# Patient Record
Sex: FEMALE | Race: BLACK OR AFRICAN AMERICAN | NOT HISPANIC OR LATINO | URBAN - METROPOLITAN AREA
[De-identification: names, ages, dates, MRNs, and addresses within clinical notes are randomized per-mention and may not be internally consistent; named-entity substitution may affect disease eponyms.]

---

## 2023-07-07 ENCOUNTER — OFFICE VISIT (OUTPATIENT)
Dept: URBAN - METROPOLITAN AREA CLINIC 118 | Facility: CLINIC | Age: 58
End: 2023-07-07

## 2023-09-06 ENCOUNTER — OFFICE VISIT (OUTPATIENT)
Dept: URBAN - METROPOLITAN AREA CLINIC 118 | Facility: CLINIC | Age: 58
End: 2023-09-06
Payer: MEDICARE

## 2023-09-06 ENCOUNTER — LAB OUTSIDE AN ENCOUNTER (OUTPATIENT)
Dept: URBAN - METROPOLITAN AREA CLINIC 118 | Facility: CLINIC | Age: 58
End: 2023-09-06

## 2023-09-06 VITALS
BODY MASS INDEX: 23.78 KG/M2 | DIASTOLIC BLOOD PRESSURE: 88 MMHG | HEART RATE: 84 BPM | SYSTOLIC BLOOD PRESSURE: 135 MMHG | WEIGHT: 148 LBS | HEIGHT: 66 IN | TEMPERATURE: 98.1 F

## 2023-09-06 DIAGNOSIS — R63.4 UNINTENTIONAL WEIGHT LOSS: ICD-10-CM

## 2023-09-06 DIAGNOSIS — R93.3 ABNORMAL CT SCAN, STOMACH: ICD-10-CM

## 2023-09-06 DIAGNOSIS — Z86.010 PERSONAL HISTORY OF COLONIC POLYPS: ICD-10-CM

## 2023-09-06 DIAGNOSIS — K83.8 DILATION OF COMMON BILE DUCT: ICD-10-CM

## 2023-09-06 PROBLEM — 428283002: Status: ACTIVE | Noted: 2023-09-06

## 2023-09-06 PROBLEM — 123608004: Status: ACTIVE | Noted: 2023-09-06

## 2023-09-06 PROCEDURE — 99204 OFFICE O/P NEW MOD 45 MIN: CPT | Performed by: INTERNAL MEDICINE

## 2023-09-06 NOTE — HPI-TODAY'S VISIT:
Ms. Shields is a 59 y/o AAF who presents today for evaluation of a lump in her stomach.  She started to notice the lump ~2 years ago. It has progressively increased in size over the past 2 years. Painful / achy at times. She reports intermittent reflux, heartburn and N/V for which she takes prn pepto.  She has lost ~50# in the past 6 months. She denies dysphagia, GI bleeding or fever.  She underwent CT A/P 5/4 which showed a focal masslike wall thickening of the gastric fundus. Also with dilatation of the common bile to 1.3 cm and mild intrahepatic biliary dilatation, nonspecific and could be within normal limits given post cholecystectomy state.  Patient reports prior EGD 5+ years ago, which she states was normal. Of note, her mother and maternal grandmother both with hx of gastric cancer. Last colonoscopy ~5 years ago with ?polyps noted.

## 2023-09-06 NOTE — PHYSICAL EXAM GASTROINTESTINAL
Abdomen , soft, golf ball sized, fixed, hard mass palpated in the epigastric region, nondistended , no guarding or rigidity , no masses palpable , normal bowel sounds , Liver and Spleen , no hepatosplenomegaly present, liver nontender Rectal  deferred

## 2023-09-15 LAB
ALBUMIN/GLOBULIN RATIO: 1.5
ALBUMIN: 4.8
ALKALINE PHOSPHATASE: 203
ALT (SGPT): 176
AST (SGOT): 526
BILIRUBIN, DIRECT: 0.3
BILIRUBIN, INDIRECT: 0.4
BILIRUBIN, TOTAL: 0.7
GLOBULIN: 3.2
PROTEIN, TOTAL: 8

## 2023-09-18 ENCOUNTER — TELEPHONE ENCOUNTER (OUTPATIENT)
Dept: URBAN - METROPOLITAN AREA CLINIC 118 | Facility: CLINIC | Age: 58
End: 2023-09-18

## 2023-09-18 ENCOUNTER — LAB OUTSIDE AN ENCOUNTER (OUTPATIENT)
Dept: URBAN - METROPOLITAN AREA CLINIC 118 | Facility: CLINIC | Age: 58
End: 2023-09-18

## 2023-09-24 ENCOUNTER — CLAIMS CREATED FROM THE CLAIM WINDOW (OUTPATIENT)
Dept: URBAN - METROPOLITAN AREA MEDICAL CENTER 16 | Facility: MEDICAL CENTER | Age: 58
End: 2023-09-24
Payer: MEDICARE

## 2023-09-24 DIAGNOSIS — R74.01 ABNORMAL/ELEVATED TRANSAMINASE (SGOT, AMINOTRANSFERASE): ICD-10-CM

## 2023-09-24 DIAGNOSIS — R10.11 RIGHT UPPER QUADRANT PAIN: ICD-10-CM

## 2023-09-24 DIAGNOSIS — R74.8 ABNORMAL ALKALINE PHOSPHATASE TEST: ICD-10-CM

## 2023-09-24 DIAGNOSIS — R93.2 ABN FIND-BILIARY TRACT: ICD-10-CM

## 2023-09-24 PROCEDURE — G8427 DOCREV CUR MEDS BY ELIG CLIN: HCPCS | Performed by: INTERNAL MEDICINE

## 2023-09-24 PROCEDURE — 99222 1ST HOSP IP/OBS MODERATE 55: CPT | Performed by: INTERNAL MEDICINE

## 2023-09-25 ENCOUNTER — CLAIMS CREATED FROM THE CLAIM WINDOW (OUTPATIENT)
Dept: URBAN - METROPOLITAN AREA MEDICAL CENTER 16 | Facility: MEDICAL CENTER | Age: 58
End: 2023-09-25
Payer: MEDICARE

## 2023-09-25 DIAGNOSIS — R74.01 ELEVATION OF LEVELS OF LIVER TRANSAMINASE LEVELS: ICD-10-CM

## 2023-09-25 DIAGNOSIS — R10.11 RIGHT UPPER QUADRANT PAIN: ICD-10-CM

## 2023-09-25 DIAGNOSIS — R94.5 ABNORMAL RESULTS OF LIVER FUNCTION STUDIES: ICD-10-CM

## 2023-09-25 DIAGNOSIS — R74.8 ABNORMAL ALKALINE PHOSPHATASE TEST: ICD-10-CM

## 2023-09-25 DIAGNOSIS — R93.2 ABN FIND-BILIARY TRACT: ICD-10-CM

## 2023-09-25 PROCEDURE — 99232 SBSQ HOSP IP/OBS MODERATE 35: CPT | Performed by: INTERNAL MEDICINE

## 2023-09-26 ENCOUNTER — CLAIMS CREATED FROM THE CLAIM WINDOW (OUTPATIENT)
Dept: URBAN - METROPOLITAN AREA MEDICAL CENTER 16 | Facility: MEDICAL CENTER | Age: 58
End: 2023-09-26
Payer: MEDICARE

## 2023-09-26 ENCOUNTER — OFFICE VISIT (OUTPATIENT)
Dept: URBAN - METROPOLITAN AREA SURGERY CENTER 23 | Facility: SURGERY CENTER | Age: 58
End: 2023-09-26

## 2023-09-26 DIAGNOSIS — R93.2 ABN FIND-BILIARY TRACT: ICD-10-CM

## 2023-09-26 DIAGNOSIS — R74.01 ELEVATION OF LEVELS OF LIVER TRANSAMINASE LEVELS: ICD-10-CM

## 2023-09-26 DIAGNOSIS — R10.11 RIGHT UPPER QUADRANT PAIN: ICD-10-CM

## 2023-09-26 DIAGNOSIS — R94.5 ABNORMAL RESULTS OF LIVER FUNCTION STUDIES: ICD-10-CM

## 2023-09-26 PROCEDURE — 99232 SBSQ HOSP IP/OBS MODERATE 35: CPT

## 2023-09-27 ENCOUNTER — CLAIMS CREATED FROM THE CLAIM WINDOW (OUTPATIENT)
Dept: URBAN - METROPOLITAN AREA MEDICAL CENTER 16 | Facility: MEDICAL CENTER | Age: 58
End: 2023-09-27
Payer: MEDICARE

## 2023-09-27 DIAGNOSIS — K29.60 ADENOPAPILLOMATOSIS GASTRICA: ICD-10-CM

## 2023-09-27 DIAGNOSIS — K29.70 GASTRITIS, UNSPECIFIED, WITHOUT BLEEDING: ICD-10-CM

## 2023-09-27 PROCEDURE — 43235 EGD DIAGNOSTIC BRUSH WASH: CPT | Performed by: INTERNAL MEDICINE

## 2023-09-27 PROCEDURE — 43239 EGD BIOPSY SINGLE/MULTIPLE: CPT | Performed by: INTERNAL MEDICINE

## 2023-09-28 ENCOUNTER — CLAIMS CREATED FROM THE CLAIM WINDOW (OUTPATIENT)
Dept: URBAN - METROPOLITAN AREA MEDICAL CENTER 16 | Facility: MEDICAL CENTER | Age: 58
End: 2023-09-28
Payer: MEDICARE

## 2023-09-28 DIAGNOSIS — R74.01 ELEVATION OF LEVELS OF LIVER TRANSAMINASE LEVELS: ICD-10-CM

## 2023-09-28 DIAGNOSIS — R10.11 ABDOMINAL BURNING SENSATION IN RIGHT UPPER QUADRANT: ICD-10-CM

## 2023-09-28 DIAGNOSIS — R93.2 ABNORMAL FINDINGS ON DIAGNOSTIC IMAGING OF LIVER AND BILIARY TRACT: ICD-10-CM

## 2023-09-28 PROCEDURE — 99231 SBSQ HOSP IP/OBS SF/LOW 25: CPT

## 2023-10-03 ENCOUNTER — TELEPHONE ENCOUNTER (OUTPATIENT)
Dept: URBAN - METROPOLITAN AREA CLINIC 118 | Facility: CLINIC | Age: 58
End: 2023-10-03

## 2023-10-03 RX ORDER — CIPROFLOXACIN HYDROCHLORIDE 500 MG/1
1 TABLET TABLET, FILM COATED ORAL
Qty: 20 TABLET | Refills: 0 | OUTPATIENT
Start: 2023-10-06 | End: 2023-10-09

## 2023-10-03 RX ORDER — METRONIDAZOLE 500 MG/1
1 TABLET TABLET ORAL THREE TIMES A DAY
Qty: 30 | Refills: 0 | OUTPATIENT
Start: 2023-10-06 | End: 2023-10-16

## 2023-11-20 ENCOUNTER — LAB OUTSIDE AN ENCOUNTER (OUTPATIENT)
Dept: URBAN - METROPOLITAN AREA CLINIC 118 | Facility: CLINIC | Age: 58
End: 2023-11-20

## 2023-11-20 ENCOUNTER — OFFICE VISIT (OUTPATIENT)
Dept: URBAN - METROPOLITAN AREA CLINIC 118 | Facility: CLINIC | Age: 58
End: 2023-11-20
Payer: MEDICARE

## 2023-11-20 VITALS
SYSTOLIC BLOOD PRESSURE: 89 MMHG | HEIGHT: 65 IN | WEIGHT: 142 LBS | BODY MASS INDEX: 23.66 KG/M2 | DIASTOLIC BLOOD PRESSURE: 60 MMHG | HEART RATE: 80 BPM | TEMPERATURE: 97.7 F

## 2023-11-20 DIAGNOSIS — R10.13 DYSPEPSIA: ICD-10-CM

## 2023-11-20 DIAGNOSIS — K76.9 LIVER LESION: ICD-10-CM

## 2023-11-20 DIAGNOSIS — R74.8 ELEVATED LIVER ENZYMES: ICD-10-CM

## 2023-11-20 DIAGNOSIS — R10.11 RUQ PAIN: ICD-10-CM

## 2023-11-20 PROBLEM — 300331000: Status: ACTIVE | Noted: 2023-11-20

## 2023-11-20 PROCEDURE — 99214 OFFICE O/P EST MOD 30 MIN: CPT

## 2023-11-20 RX ORDER — PANTOPRAZOLE SODIUM 40 MG/1
1 TABLET TABLET, DELAYED RELEASE ORAL ONCE A DAY
Qty: 30 TABLET | Refills: 5 | OUTPATIENT
Start: 2023-11-20

## 2023-11-20 NOTE — PHYSICAL EXAM GASTROINTESTINAL
Abdomen , soft, RUQ / epigastric region mildly TTP, nondistended , no guarding or rigidity , no masses palpable , normal bowel sounds , Liver and Spleen , no hepatosplenomegaly present, liver nontender Rectal  deferred

## 2023-11-20 NOTE — HPI-TODAY'S VISIT:
11/20/23- pt is a 57 y/o AAF who presents today for f/u appt.  Since last viist, she was admitted to [facility] from 9/24 to 9/29 due to abdominal. While admitted, she was noted to have elevated LFTs AST / ALT = 234 / 164. She had MRCP which showed a complex septated cystic lesion in the periphery of theright hepatic lobe measuring 1.7 cm with thickened enhancing septations and restricted diffusion. She underwent IR bx with path revealing benign hepatic parenchyma with detached collections of acute inflammatory cells, consistent with abscess.  She also underwent EGD with Dr. Nagy while admitted which showed gastritis, bx negative.  Results were reviewed by our office and patient was started on abx empirically.  Today, she reports intermittent RUQ pain. Occurs several days per week and can last a few hours to the entire day. She is unable to characterize the pain. Worse with eating. She notes N/V with the pain.  She has hx of chronic intermittent reflux. Currently taking pepto prn.  Weight is down 6# since last visit. Notes good appetite.  She denies GI bleeding, changes in bowel habits, dysphagia or early satiety.  ----------------------------------- 9/6/23- Ms. Shields is a 57 y/o AAF who presents today for evaluation of a lump in her stomach.  She started to notice the lump ~2 years ago. It has progressively increased in size over the past 2 years. Painful / achy at times. She reports intermittent reflux, heartburn and N/V for which she takes prn pepto.  She has lost ~50# in the past 6 months. She denies dysphagia, GI bleeding or fever.  She underwent CT A/P 5/4 which showed a focal masslike wall thickening of the gastric fundus. Also with dilatation of the common bile to 1.3 cm and mild intrahepatic biliary dilatation, nonspecific and could be within normal limits given post cholecystectomy state.  Patient reports prior EGD 5+ years ago, which she states was normal. Of note, her mother and maternal grandmother both with hx of gastric cancer. Last colonoscopy ~5 years ago with ?polyps noted.

## 2023-11-21 ENCOUNTER — TELEPHONE ENCOUNTER (OUTPATIENT)
Dept: URBAN - METROPOLITAN AREA CLINIC 118 | Facility: CLINIC | Age: 58
End: 2023-11-21

## 2023-11-21 LAB
ABSOLUTE BASOPHILS: 32
ABSOLUTE EOSINOPHILS: 52
ABSOLUTE LYMPHOCYTES: 1032
ABSOLUTE MONOCYTES: 312
ABSOLUTE NEUTROPHILS: 2572
ALBUMIN/GLOBULIN RATIO: 1.6
ALBUMIN: 4.4
ALKALINE PHOSPHATASE: 171
ALT (SGPT): 13
AST (SGOT): 15
BASOPHILS: 0.8
BILIRUBIN, DIRECT: 0.1
BILIRUBIN, INDIRECT: 0.3
BILIRUBIN, TOTAL: 0.4
EOSINOPHILS: 1.3
GLOBULIN: 2.7
HEMATOCRIT: 34.8
HEMOGLOBIN: 11.8
LYMPHOCYTES: 25.8
MCH: 32.2
MCHC: 33.9
MCV: 94.8
MONOCYTES: 7.8
MPV: 11.5
NEUTROPHILS: 64.3
PLATELET COUNT: 212
PROTEIN, TOTAL: 7.1
RDW: 14
RED BLOOD CELL COUNT: 3.67
WHITE BLOOD CELL COUNT: 4

## 2023-12-05 LAB
ALBUMIN/GLOBULIN RATIO: 1.6
ALBUMIN: 4.2
ALKALINE PHOSPHATASE: 136
ALKALINE PHOSPHATASE: 145
ALT (SGPT): 13
AST (SGOT): 19
BILIRUBIN, DIRECT: 0
BILIRUBIN, INDIRECT: 0.3
BILIRUBIN, TOTAL: 0.3
BONE ISOENZYMES: 68
GGT: 130
GLOBULIN: 2.7
INTESTINAL ISOENZYMES: 0
LIVER ISOENZYMES: 32
MACROHEPATIC ISOENZYMES: 0
MITOCHONDRIAL (M2) ANTIBODY: <=20
PLACENTAL ISOENZYMES: 0
PROTEIN, TOTAL: 6.9

## 2024-01-16 ENCOUNTER — OFFICE VISIT (OUTPATIENT)
Dept: URBAN - METROPOLITAN AREA CLINIC 118 | Facility: CLINIC | Age: 59
End: 2024-01-16
Payer: MEDICARE

## 2024-01-16 ENCOUNTER — OFFICE VISIT (OUTPATIENT)
Dept: URBAN - METROPOLITAN AREA CLINIC 118 | Facility: CLINIC | Age: 59
End: 2024-01-16

## 2024-01-16 ENCOUNTER — LAB OUTSIDE AN ENCOUNTER (OUTPATIENT)
Dept: URBAN - METROPOLITAN AREA CLINIC 118 | Facility: CLINIC | Age: 59
End: 2024-01-16

## 2024-01-16 VITALS
DIASTOLIC BLOOD PRESSURE: 72 MMHG | HEIGHT: 65 IN | TEMPERATURE: 98.2 F | BODY MASS INDEX: 24.06 KG/M2 | HEART RATE: 69 BPM | WEIGHT: 144.4 LBS | SYSTOLIC BLOOD PRESSURE: 107 MMHG

## 2024-01-16 DIAGNOSIS — R74.8 ELEVATED LIVER ENZYMES: ICD-10-CM

## 2024-01-16 DIAGNOSIS — K75.0 LIVER ABSCESS: ICD-10-CM

## 2024-01-16 DIAGNOSIS — R10.11 RUQ PAIN: ICD-10-CM

## 2024-01-16 DIAGNOSIS — K76.9 LIVER LESION: ICD-10-CM

## 2024-01-16 DIAGNOSIS — R10.13 DYSPEPSIA: ICD-10-CM

## 2024-01-16 DIAGNOSIS — Z80.0 FAMILY HISTORY OF COLON CANCER: ICD-10-CM

## 2024-01-16 DIAGNOSIS — R63.4 UNINTENTIONAL WEIGHT LOSS: ICD-10-CM

## 2024-01-16 PROCEDURE — 99214 OFFICE O/P EST MOD 30 MIN: CPT | Performed by: INTERNAL MEDICINE

## 2024-01-16 RX ORDER — PROPRANOLOL HYDROCHLORIDE 60 MG/1
TAKE 1 TABLET BY MOUTH EVERY DAY TABLET ORAL
Qty: 30 EACH | Refills: 1 | Status: ACTIVE | COMMUNITY

## 2024-01-16 RX ORDER — AMLODIPINE BESYLATE 10 MG/1
TABLET ORAL
Qty: 180 TABLET | Status: ON HOLD | COMMUNITY

## 2024-01-16 RX ORDER — TIZANIDINE 4 MG/1
TABLET ORAL
Qty: 360 TABLET | Status: ACTIVE | COMMUNITY

## 2024-01-16 RX ORDER — ACETAMINOPHEN AND CODEINE PHOSPHATE 300; 30 MG/1; MG/1
TABLET ORAL
Qty: 120 TABLET | Status: ACTIVE | COMMUNITY

## 2024-01-16 RX ORDER — AMLODIPINE BESYLATE 10 MG/1
TABLET ORAL
Qty: 180 TABLET | Status: ACTIVE | COMMUNITY

## 2024-01-16 RX ORDER — PANTOPRAZOLE SODIUM 40 MG/1
1 TABLET TABLET, DELAYED RELEASE ORAL ONCE A DAY
Qty: 30 TABLET | Refills: 5 | Status: ACTIVE | COMMUNITY
Start: 2023-11-20

## 2024-01-16 RX ORDER — CARISOPRODOL 350 MG/1
TABLET ORAL
Qty: 360 TABLET | Status: ACTIVE | COMMUNITY

## 2024-01-16 RX ORDER — PANTOPRAZOLE SODIUM 40 MG/1
1 TABLET TABLET, DELAYED RELEASE ORAL ONCE A DAY
Qty: 30 TABLET | Refills: 5 | Status: ON HOLD | COMMUNITY
Start: 2023-11-20

## 2024-01-16 NOTE — HPI-TODAY'S VISIT:
01/16/2024 follow up visit.  Last visit with SHEA Joshi. Follow up MRI liver was ordered and is scheduled for Feb.  Patient reports persistent intermittent RUQ pain but not as severe as previously. States she continues to lose weight, which has been going up and down. Appetite is poor. No nausea/vomiting.  Denies any GI bleeding symptoms.   ---------------------------------------------------------------------------------- 11/20/23- pt is a 57 y/o AAF who presents today for f/u appt.  Since last viist, she was admitted to [facility] from 9/24 to 9/29 due to abdominal. While admitted, she was noted to have elevated LFTs AST / ALT = 234 / 164. She had MRCP which showed a complex septated cystic lesion in the periphery of theright hepatic lobe measuring 1.7 cm with thickened enhancing septations and restricted diffusion. She underwent IR bx with path revealing benign hepatic parenchyma with detached collections of acute inflammatory cells, consistent with abscess.  She also underwent EGD with Dr. Nagy while admitted which showed gastritis, bx negative.  Results were reviewed by our office and patient was started on abx empirically.  Today, she reports intermittent RUQ pain. Occurs several days per week and can last a few hours to the entire day. She is unable to characterize the pain. Worse with eating. She notes N/V with the pain.  She has hx of chronic intermittent reflux. Currently taking pepto prn.  Weight is down 6# since last visit. Notes good appetite.  She denies GI bleeding, changes in bowel habits, dysphagia or early satiety.  ----------------------------------- 9/6/23- Ms. Shields is a 57 y/o AAF who presents today for evaluation of a lump in her stomach.  She started to notice the lump ~2 years ago. It has progressively increased in size over the past 2 years. Painful / achy at times. She reports intermittent reflux, heartburn and N/V for which she takes prn pepto.  She has lost ~50# in the past 6 months. She denies dysphagia, GI bleeding or fever.  She underwent CT A/P 5/4 which showed a focal masslike wall thickening of the gastric fundus. Also with dilatation of the common bile to 1.3 cm and mild intrahepatic biliary dilatation, nonspecific and could be within normal limits given post cholecystectomy state.  Patient reports prior EGD 5+ years ago, which she states was normal. Of note, her mother and maternal grandmother both with hx of gastric cancer. Last colonoscopy ~5 years ago with ?polyps noted.

## 2024-01-17 LAB
A/G RATIO: 1.6
ALBUMIN: 4.6
ALKALINE PHOSPHATASE: 225
ALT (SGPT): 74
AST (SGOT): 30
BILIRUBIN, TOTAL: 0.2
BUN/CREATININE RATIO: (no result)
BUN: 13
CALCIUM: 9.3
CARBON DIOXIDE, TOTAL: 26
CHLORIDE: 105
CREATININE: 0.8
EGFR: 85
GLOBULIN, TOTAL: 2.8
GLUCOSE: 64
HBSAG SCREEN: (no result)
HEMATOCRIT: 37.2
HEMOGLOBIN: 12.4
HEP A AB, IGM: (no result)
HEP B CORE AB, IGM: (no result)
HEPATITIS C ANTIBODY: (no result)
MCH: 33.2
MCHC: 33.3
MCV: 99.5
MPV: 11.9
PLATELET COUNT: 252
POTASSIUM: 4.9
PROTEIN, TOTAL: 7.4
RDW: 15.1
RED BLOOD CELL COUNT: 3.74
SODIUM: 140
WHITE BLOOD CELL COUNT: 2.9

## 2024-01-26 ENCOUNTER — OUT OF OFFICE VISIT (OUTPATIENT)
Dept: URBAN - METROPOLITAN AREA SURGERY CENTER 23 | Facility: SURGERY CENTER | Age: 59
End: 2024-01-26
Payer: MEDICARE

## 2024-01-26 DIAGNOSIS — R10.11 ABDOMINAL BURNING SENSATION IN RIGHT UPPER QUADRANT: ICD-10-CM

## 2024-01-26 DIAGNOSIS — Z09 ENCOUNTER FOR COLONOSCOPY FOLLOWING COLON POLYP REMOVAL: ICD-10-CM

## 2024-01-26 DIAGNOSIS — Z86.010 PERSONAL HISTORY OF COLONIC POLYP: ICD-10-CM

## 2024-01-26 DIAGNOSIS — Z80.0 FAMILY HISTORY OF MALIGNANT NEOPLASM OF DIGESTIVE ORGANS: ICD-10-CM

## 2024-01-26 DIAGNOSIS — R63.4 ABNORMAL WEIGHT LOSS: ICD-10-CM

## 2024-01-26 DIAGNOSIS — Z86.010 ADENOMAS PERSONAL HISTORY OF COLONIC POLYPS: ICD-10-CM

## 2024-01-26 DIAGNOSIS — K64.1 GRADE II HEMORRHOIDS: ICD-10-CM

## 2024-01-26 PROCEDURE — 45378 DIAGNOSTIC COLONOSCOPY: CPT | Performed by: INTERNAL MEDICINE

## 2024-01-26 PROCEDURE — G8907 PT DOC NO EVENTS ON DISCHARG: HCPCS | Performed by: INTERNAL MEDICINE

## 2024-01-26 PROCEDURE — 00811 ANES LWR INTST NDSC NOS: CPT | Performed by: NURSE ANESTHETIST, CERTIFIED REGISTERED

## 2024-01-26 RX ORDER — AMLODIPINE BESYLATE 10 MG/1
TABLET ORAL
Qty: 180 TABLET | Status: ACTIVE | COMMUNITY

## 2024-01-26 RX ORDER — ACETAMINOPHEN AND CODEINE PHOSPHATE 300; 30 MG/1; MG/1
TABLET ORAL
Qty: 120 TABLET | Status: ACTIVE | COMMUNITY

## 2024-01-26 RX ORDER — TIZANIDINE 4 MG/1
TABLET ORAL
Qty: 360 TABLET | Status: ACTIVE | COMMUNITY

## 2024-01-26 RX ORDER — PANTOPRAZOLE SODIUM 40 MG/1
1 TABLET TABLET, DELAYED RELEASE ORAL ONCE A DAY
Qty: 30 TABLET | Refills: 5 | Status: ACTIVE | COMMUNITY
Start: 2023-11-20

## 2024-01-26 RX ORDER — PROPRANOLOL HYDROCHLORIDE 60 MG/1
TAKE 1 TABLET BY MOUTH EVERY DAY TABLET ORAL
Qty: 30 EACH | Refills: 1 | Status: ACTIVE | COMMUNITY

## 2024-01-26 RX ORDER — CARISOPRODOL 350 MG/1
TABLET ORAL
Qty: 360 TABLET | Status: ACTIVE | COMMUNITY

## 2024-01-26 RX ORDER — AMLODIPINE BESYLATE 10 MG/1
TABLET ORAL
Qty: 180 TABLET | Status: ON HOLD | COMMUNITY

## 2024-01-29 ENCOUNTER — TELEPHONE ENCOUNTER (OUTPATIENT)
Dept: URBAN - METROPOLITAN AREA CLINIC 118 | Facility: CLINIC | Age: 59
End: 2024-01-29

## 2024-03-15 ENCOUNTER — OV EP (OUTPATIENT)
Dept: URBAN - METROPOLITAN AREA CLINIC 118 | Facility: CLINIC | Age: 59
End: 2024-03-15

## 2024-03-15 ENCOUNTER — LAB (OUTPATIENT)
Dept: URBAN - METROPOLITAN AREA CLINIC 118 | Facility: CLINIC | Age: 59
End: 2024-03-15

## 2024-03-15 VITALS
TEMPERATURE: 97.3 F | DIASTOLIC BLOOD PRESSURE: 54 MMHG | HEIGHT: 65 IN | SYSTOLIC BLOOD PRESSURE: 71 MMHG | BODY MASS INDEX: 22.86 KG/M2 | HEART RATE: 82 BPM | WEIGHT: 137.2 LBS

## 2024-03-15 RX ORDER — ACETAMINOPHEN AND CODEINE PHOSPHATE 300; 30 MG/1; MG/1
TABLET ORAL
Qty: 120 TABLET | Status: ACTIVE | COMMUNITY

## 2024-03-15 RX ORDER — PROPRANOLOL HYDROCHLORIDE 60 MG/1
TABLET ORAL
Qty: 90 TABLET | Status: ON HOLD | COMMUNITY

## 2024-03-15 RX ORDER — TIZANIDINE 4 MG/1
TABLET ORAL
Qty: 360 TABLET | Status: ACTIVE | COMMUNITY

## 2024-03-15 RX ORDER — AMLODIPINE BESYLATE 10 MG/1
TABLET ORAL
Qty: 180 TABLET | Status: ON HOLD | COMMUNITY

## 2024-03-15 RX ORDER — PANTOPRAZOLE SODIUM 40 MG/1
1 TABLET TABLET, DELAYED RELEASE ORAL ONCE A DAY
Qty: 30 TABLET | Refills: 5 | Status: ACTIVE | COMMUNITY
Start: 2023-11-20

## 2024-03-15 RX ORDER — LISINOPRIL AND HYDROCHLOROTHIAZIDE 12.5; 2 MG/1; MG/1
TABLET ORAL
Qty: 90 TABLET | Status: ACTIVE | COMMUNITY

## 2024-03-15 RX ORDER — CARISOPRODOL 350 MG/1
TABLET ORAL
Qty: 360 TABLET | Status: ACTIVE | COMMUNITY

## 2024-03-15 RX ORDER — AMLODIPINE BESYLATE 10 MG/1
TABLET ORAL
Qty: 180 TABLET | Status: ACTIVE | COMMUNITY

## 2024-03-15 RX ORDER — PROPRANOLOL HYDROCHLORIDE 60 MG/1
TAKE 1 TABLET BY MOUTH EVERY DAY TABLET ORAL
Qty: 30 EACH | Refills: 1 | Status: ACTIVE | COMMUNITY

## 2024-03-15 NOTE — HPI-TODAY'S VISIT:
---------------------------------------------------------------------------------------------- 01/16/2024 follow up visit.  Last visit with SHEA Joshi. Follow up MRI liver was ordered and is scheduled for Feb.  Patient reports persistent intermittent RUQ pain but not as severe as previously. States she continues to lose weight, which has been going up and down. Appetite is poor. No nausea/vomiting.  Denies any GI bleeding symptoms.   ---------------------------------------------------------------------------------- 11/20/23- pt is a 59 y/o AAF who presents today for f/u appt.  Since last viist, she was admitted to [facility] from 9/24 to 9/29 due to abdominal. While admitted, she was noted to have elevated LFTs AST / ALT = 234 / 164. She had MRCP which showed a complex septated cystic lesion in the periphery of theright hepatic lobe measuring 1.7 cm with thickened enhancing septations and restricted diffusion. She underwent IR bx with path revealing benign hepatic parenchyma with detached collections of acute inflammatory cells, consistent with abscess.  She also underwent EGD with Dr. Nagy while admitted which showed gastritis, bx negative.  Results were reviewed by our office and patient was started on abx empirically.  Today, she reports intermittent RUQ pain. Occurs several days per week and can last a few hours to the entire day. She is unable to characterize the pain. Worse with eating. She notes N/V with the pain.  She has hx of chronic intermittent reflux. Currently taking pepto prn.  Weight is down 6# since last visit. Notes good appetite.  She denies GI bleeding, changes in bowel habits, dysphagia or early satiety.  ----------------------------------- 9/6/23- Ms. Shields is a 59 y/o AAF who presents today for evaluation of a lump in her stomach.  She started to notice the lump ~2 years ago. It has progressively increased in size over the past 2 years. Painful / achy at times. She reports intermittent reflux, heartburn and N/V for which she takes prn pepto.  She has lost ~50# in the past 6 months. She denies dysphagia, GI bleeding or fever.  She underwent CT A/P 5/4 which showed a focal masslike wall thickening of the gastric fundus. Also with dilatation of the common bile to 1.3 cm and mild intrahepatic biliary dilatation, nonspecific and could be within normal limits given post cholecystectomy state.  Patient reports prior EGD 5+ years ago, which she states was normal. Of note, her mother and maternal grandmother both with hx of gastric cancer. Last colonoscopy ~5 years ago with ?polyps noted.

## 2024-03-21 LAB
ACTIN (SMOOTH MUSCLE) ANTIBODY (IGG): <20
AFP, SERUM, TUMOR MARKER: 4.9
ALBUMIN/GLOBULIN RATIO: 1.6
ALBUMIN: 4.1
ALKALINE PHOSPHATASE: 202
ALT (SGPT): 44
AST (SGOT): 22
BILIRUBIN, DIRECT: 0.1
BILIRUBIN, INDIRECT: 0.2
BILIRUBIN, TOTAL: 0.3
GLOBULIN: 2.6
MITOCHONDRIAL (M2) ANTIBODY: <=20
PROTEIN, TOTAL: 6.7
TSH: 0.65

## 2024-04-29 ENCOUNTER — OV EP (OUTPATIENT)
Dept: URBAN - METROPOLITAN AREA CLINIC 118 | Facility: CLINIC | Age: 59
End: 2024-04-29

## 2024-04-29 VITALS
BODY MASS INDEX: 23.39 KG/M2 | DIASTOLIC BLOOD PRESSURE: 87 MMHG | SYSTOLIC BLOOD PRESSURE: 143 MMHG | HEART RATE: 79 BPM | WEIGHT: 140.4 LBS | TEMPERATURE: 97.7 F | HEIGHT: 65 IN

## 2024-04-29 RX ORDER — AMLODIPINE BESYLATE 10 MG/1
TABLET ORAL
Qty: 180 TABLET | Status: ON HOLD | COMMUNITY

## 2024-04-29 RX ORDER — PROPRANOLOL HYDROCHLORIDE 60 MG/1
TABLET ORAL
Qty: 90 TABLET | Status: ON HOLD | COMMUNITY

## 2024-04-29 RX ORDER — LISINOPRIL AND HYDROCHLOROTHIAZIDE 12.5; 2 MG/1; MG/1
1 TABLET TABLET ORAL ONCE A DAY
Qty: 90 TABLET | Status: ACTIVE | COMMUNITY

## 2024-04-29 RX ORDER — ACETAMINOPHEN AND CODEINE PHOSPHATE 300; 30 MG/1; MG/1
1 TABLET AS NEEDED TABLET ORAL
Qty: 40 TABLET | Status: ACTIVE | COMMUNITY

## 2024-04-29 RX ORDER — AMLODIPINE BESYLATE 10 MG/1
1 TABLET TABLET ORAL ONCE A DAY
Qty: 90 TABLET | Status: ACTIVE | COMMUNITY

## 2024-04-29 RX ORDER — PANTOPRAZOLE SODIUM 40 MG/1
1 TABLET TABLET, DELAYED RELEASE ORAL ONCE A DAY
Qty: 30 TABLET | Refills: 5 | Status: ACTIVE | COMMUNITY
Start: 2023-11-20

## 2024-04-29 RX ORDER — PROPRANOLOL HYDROCHLORIDE 60 MG/1
TAKE 1 TABLET BY MOUTH EVERY DAY TABLET ORAL
Qty: 30 EACH | Refills: 1 | Status: ACTIVE | COMMUNITY

## 2024-04-29 RX ORDER — CARISOPRODOL 350 MG/1
1 TABLET AS NEEDED TABLET ORAL THREE TIMES A DAY
Qty: 90 TABLET | Status: ACTIVE | COMMUNITY

## 2024-04-29 RX ORDER — TIZANIDINE 4 MG/1
1 TABLET AT BEDTIME AS NEEDED TABLET ORAL ONCE A DAY
Qty: 90 TABLET | Status: ACTIVE | COMMUNITY

## 2024-04-30 LAB
ALBUMIN/GLOBULIN RATIO: 1.5
ALBUMIN: 4.6
ALKALINE PHOSPHATASE: 211
ALT (SGPT): 22
AST (SGOT): 24
BILIRUBIN, DIRECT: 0.1
BILIRUBIN, INDIRECT: 0.2
BILIRUBIN, TOTAL: 0.3
GGT: 163
GLOBULIN: 3.1
PROTEIN, TOTAL: 7.7

## 2024-07-11 ENCOUNTER — DASHBOARD ENCOUNTERS (OUTPATIENT)
Age: 59
End: 2024-07-11

## 2024-07-11 ENCOUNTER — OFFICE VISIT (OUTPATIENT)
Dept: URBAN - METROPOLITAN AREA CLINIC 118 | Facility: CLINIC | Age: 59
End: 2024-07-11
Payer: MEDICARE

## 2024-07-11 VITALS
HEART RATE: 76 BPM | BODY MASS INDEX: 23.66 KG/M2 | SYSTOLIC BLOOD PRESSURE: 127 MMHG | HEIGHT: 65 IN | DIASTOLIC BLOOD PRESSURE: 83 MMHG | TEMPERATURE: 98.6 F | WEIGHT: 142 LBS

## 2024-07-11 DIAGNOSIS — K76.89 LIVER LESION: ICD-10-CM

## 2024-07-11 DIAGNOSIS — K75.0 LIVER ABSCESS: ICD-10-CM

## 2024-07-11 DIAGNOSIS — R10.11 RUQ PAIN: ICD-10-CM

## 2024-07-11 DIAGNOSIS — R74.8 ELEVATED LIVER ENZYMES: ICD-10-CM

## 2024-07-11 PROCEDURE — 99213 OFFICE O/P EST LOW 20 MIN: CPT | Performed by: INTERNAL MEDICINE

## 2024-07-11 RX ORDER — PROPRANOLOL HYDROCHLORIDE 60 MG/1
TAKE 1 TABLET BY MOUTH TWICE A DAY TABLET ORAL
Qty: 180 EACH | Refills: 0 | Status: ACTIVE | COMMUNITY

## 2024-07-11 RX ORDER — CARISOPRODOL 350 MG/1
1 TABLET AS NEEDED TABLET ORAL THREE TIMES A DAY
Qty: 90 TABLET | Status: DISCONTINUED | COMMUNITY

## 2024-07-11 RX ORDER — AMLODIPINE BESYLATE 10 MG/1
TABLET ORAL
Qty: 180 TABLET | Status: DISCONTINUED | COMMUNITY

## 2024-07-11 RX ORDER — PANTOPRAZOLE SODIUM 40 MG/1
1 TABLET TABLET, DELAYED RELEASE ORAL ONCE A DAY
Qty: 30 TABLET | Refills: 5 | Status: DISCONTINUED | COMMUNITY
Start: 2023-11-20

## 2024-07-11 RX ORDER — PROPRANOLOL HYDROCHLORIDE 60 MG/1
TABLET ORAL
Qty: 90 TABLET | Status: DISCONTINUED | COMMUNITY

## 2024-07-11 RX ORDER — GABAPENTIN 300 MG/1
CAPSULE ORAL
Qty: 270 CAPSULE | Status: ACTIVE | COMMUNITY

## 2024-07-11 RX ORDER — ACETAMINOPHEN AND CODEINE PHOSPHATE 300; 30 MG/1; MG/1
1 TABLET AS NEEDED TABLET ORAL
Qty: 40 TABLET | Status: DISCONTINUED | COMMUNITY

## 2024-07-11 RX ORDER — TIZANIDINE 4 MG/1
TABLET ORAL
Qty: 360 TABLET | Status: ACTIVE | COMMUNITY

## 2024-07-11 RX ORDER — CARISOPRODOL 350 MG/1
TAKE 1 TO 2 TABLETS BY MOUTH EVERY 4 TO 6 HOURS AS NEEDED FOR PAIN TABLET ORAL
Qty: 120 EACH | Refills: 0 | Status: ACTIVE | COMMUNITY

## 2024-07-11 RX ORDER — LISINOPRIL AND HYDROCHLOROTHIAZIDE 12.5; 2 MG/1; MG/1
1 TABLET TABLET ORAL ONCE A DAY
Qty: 90 TABLET | Status: DISCONTINUED | COMMUNITY

## 2024-07-11 RX ORDER — PROPRANOLOL HYDROCHLORIDE 60 MG/1
TAKE 1 TABLET BY MOUTH EVERY DAY TABLET ORAL
Qty: 30 EACH | Refills: 1 | Status: DISCONTINUED | COMMUNITY

## 2024-07-11 RX ORDER — METHIMAZOLE 5 MG/1
TABLET ORAL
Qty: 90 TABLET | Status: ACTIVE | COMMUNITY

## 2024-07-11 RX ORDER — TIZANIDINE 4 MG/1
1 TABLET AT BEDTIME AS NEEDED TABLET ORAL ONCE A DAY
Qty: 90 TABLET | Status: DISCONTINUED | COMMUNITY

## 2024-07-11 RX ORDER — LISINOPRIL AND HYDROCHLOROTHIAZIDE 12.5; 2 MG/1; MG/1
TABLET ORAL
Qty: 90 TABLET | Status: ACTIVE | COMMUNITY

## 2024-07-11 RX ORDER — AMLODIPINE BESYLATE 10 MG/1
1 TABLET TABLET ORAL ONCE A DAY
Qty: 90 TABLET | Status: ACTIVE | COMMUNITY

## 2024-07-11 NOTE — HPI-TODAY'S VISIT:
This is a 57 yo female with pmh of HTN, liver abscess, and colon polyps here for a follow up visit.  Since last visit, patient was admitted at [facility] recently for hypotension. Has had medication adjustment and work up for orthostatic hypotension.  Reports she has occasional dizziness but otherwise has been doing well.  Her abdominal pain is somewhat improved and stable. No nausea/vomiting, GI bleeding or dysphagia.  She was seen by [facility] for second opinion and additional labs done.  Weight has been stable.

## 2024-10-11 ENCOUNTER — OFFICE VISIT (OUTPATIENT)
Dept: URBAN - METROPOLITAN AREA CLINIC 118 | Facility: CLINIC | Age: 59
End: 2024-10-11
Payer: MEDICARE

## 2024-10-11 VITALS
BODY MASS INDEX: 26.82 KG/M2 | TEMPERATURE: 98.1 F | DIASTOLIC BLOOD PRESSURE: 81 MMHG | HEIGHT: 65 IN | WEIGHT: 161 LBS | HEART RATE: 78 BPM | SYSTOLIC BLOOD PRESSURE: 119 MMHG

## 2024-10-11 DIAGNOSIS — Z80.0 FAMILY HISTORY OF COLON CANCER: ICD-10-CM

## 2024-10-11 DIAGNOSIS — K75.0 LIVER ABSCESS: ICD-10-CM

## 2024-10-11 DIAGNOSIS — R10.11 RUQ PAIN: ICD-10-CM

## 2024-10-11 DIAGNOSIS — R63.4 UNINTENTIONAL WEIGHT LOSS: ICD-10-CM

## 2024-10-11 DIAGNOSIS — R74.8 ELEVATED ALKALINE PHOSPHATASE LEVEL: ICD-10-CM

## 2024-10-11 DIAGNOSIS — K76.9 LIVER LESION: ICD-10-CM

## 2024-10-11 DIAGNOSIS — R10.13 DYSPEPSIA: ICD-10-CM

## 2024-10-11 PROCEDURE — 99213 OFFICE O/P EST LOW 20 MIN: CPT | Performed by: INTERNAL MEDICINE

## 2024-10-11 RX ORDER — AMLODIPINE BESYLATE 10 MG/1
1 TABLET TABLET ORAL ONCE A DAY
Qty: 90 TABLET | Status: ACTIVE | COMMUNITY

## 2024-10-11 RX ORDER — CARISOPRODOL 350 MG/1
TAKE 1 TO 2 TABLETS BY MOUTH EVERY 4 TO 6 HOURS AS NEEDED FOR PAIN TABLET ORAL
Qty: 120 EACH | Refills: 0 | Status: ACTIVE | COMMUNITY

## 2024-10-11 RX ORDER — TIZANIDINE 4 MG/1
TABLET ORAL
Qty: 360 TABLET | Status: ACTIVE | COMMUNITY

## 2024-10-11 RX ORDER — LISINOPRIL AND HYDROCHLOROTHIAZIDE 12.5; 2 MG/1; MG/1
TABLET ORAL
Qty: 90 TABLET | Status: ACTIVE | COMMUNITY

## 2024-10-11 RX ORDER — GABAPENTIN 300 MG/1
CAPSULE ORAL
Qty: 270 CAPSULE | Status: ACTIVE | COMMUNITY

## 2024-10-11 RX ORDER — PROPRANOLOL HYDROCHLORIDE 60 MG/1
TAKE 1 TABLET BY MOUTH TWICE A DAY TABLET ORAL
Qty: 180 EACH | Refills: 0 | Status: ACTIVE | COMMUNITY

## 2024-10-11 RX ORDER — METHIMAZOLE 5 MG/1
TABLET ORAL
Qty: 90 TABLET | Status: ACTIVE | COMMUNITY

## 2024-10-11 NOTE — HPI-TODAY'S VISIT:
This is a 60 yo female with pmh of HTN, liver abscess, and colon polyps here for a follow up visit.   Patient has been doing well. She gained weight since last visit. Feels she has her appetite back.  She still has occasional episodes of abdominal pain but overall improved. No nausea/vomiting.

## 2024-10-12 LAB
ALBUMIN/GLOBULIN RATIO: 1.5
ALBUMIN: 4.3
ALKALINE PHOSPHATASE: 162
ALT (SGPT): 16
AST (SGOT): 21
BILIRUBIN, DIRECT: 0
BILIRUBIN, INDIRECT: 0.2
BILIRUBIN, TOTAL: 0.2
GGT: 75
GLOBULIN: 2.9
PROTEIN, TOTAL: 7.2

## 2025-04-22 ENCOUNTER — OFFICE VISIT (OUTPATIENT)
Dept: URBAN - METROPOLITAN AREA CLINIC 118 | Facility: CLINIC | Age: 60
End: 2025-04-22
Payer: MEDICARE

## 2025-04-22 DIAGNOSIS — R10.13 DYSPEPSIA: ICD-10-CM

## 2025-04-22 DIAGNOSIS — K76.9 LIVER LESION: ICD-10-CM

## 2025-04-22 DIAGNOSIS — R74.8 ELEVATED ALKALINE PHOSPHATASE LEVEL: ICD-10-CM

## 2025-04-22 DIAGNOSIS — R63.4 UNINTENTIONAL WEIGHT LOSS: ICD-10-CM

## 2025-04-22 DIAGNOSIS — K75.0 LIVER ABSCESS: ICD-10-CM

## 2025-04-22 DIAGNOSIS — R53.1 LEFT-SIDED WEAKNESS: ICD-10-CM

## 2025-04-22 DIAGNOSIS — Z80.0 FAMILY HISTORY OF COLON CANCER: ICD-10-CM

## 2025-04-22 DIAGNOSIS — R10.11 RUQ PAIN: ICD-10-CM

## 2025-04-22 PROCEDURE — 99213 OFFICE O/P EST LOW 20 MIN: CPT

## 2025-04-22 RX ORDER — PROPRANOLOL HYDROCHLORIDE 60 MG/1
TAKE 1 TABLET BY MOUTH TWICE A DAY TABLET ORAL
Qty: 180 EACH | Refills: 0 | Status: ACTIVE | COMMUNITY

## 2025-04-22 RX ORDER — TIZANIDINE 4 MG/1
TABLET ORAL
Qty: 360 TABLET | Status: ACTIVE | COMMUNITY

## 2025-04-22 RX ORDER — CARISOPRODOL 350 MG/1
TAKE 1 TO 2 TABLETS BY MOUTH EVERY 4 TO 6 HOURS AS NEEDED FOR PAIN TABLET ORAL
Qty: 120 EACH | Refills: 0 | Status: ACTIVE | COMMUNITY

## 2025-04-22 RX ORDER — AMLODIPINE BESYLATE 10 MG/1
1 TABLET TABLET ORAL ONCE A DAY
Qty: 90 TABLET | Status: ACTIVE | COMMUNITY

## 2025-04-22 RX ORDER — LISINOPRIL AND HYDROCHLOROTHIAZIDE 12.5; 2 MG/1; MG/1
TABLET ORAL
Qty: 90 TABLET | Status: ACTIVE | COMMUNITY

## 2025-04-22 RX ORDER — METHIMAZOLE 5 MG/1
TABLET ORAL
Qty: 90 TABLET | Status: ACTIVE | COMMUNITY

## 2025-04-22 RX ORDER — GABAPENTIN 300 MG/1
CAPSULE ORAL
Qty: 270 CAPSULE | Status: ACTIVE | COMMUNITY

## 2025-04-22 NOTE — HPI-TODAY'S VISIT:
Patient is a 58 y/o female with PMHx of HTN, liver abscess, and colon polyps here for a follow up visit. Friend is present with her.  Last visit was 6 months ago with Dr. Nagy. She had repeat labs with ALP tending down, other LFTs WNL.  Her weight is uo 20#. Eating well, has good appetite. Having a BM with taking the Miralax every day. Denies GI bleeding. Still with mild right sided abdominal pain, but this is unchanged. No NV or fevers.  Of note, she has developed left sided weakness within the past month. She saw cardiology last week and was recommended to go to the ER for evaluation but did not go. She has follow up appt with them today at 2pm. She is still with left sided weakness, has difficulty opening doors or lifting things.

## 2025-04-23 ENCOUNTER — TELEPHONE ENCOUNTER (OUTPATIENT)
Dept: URBAN - METROPOLITAN AREA CLINIC 118 | Facility: CLINIC | Age: 60
End: 2025-04-23

## 2025-04-23 LAB
ALBUMIN/GLOBULIN RATIO: 1.5
ALBUMIN: 4.4
ALKALINE PHOSPHATASE: 207
ALT (SGPT): 59
AST (SGOT): 54
BILIRUBIN, DIRECT: 0
BILIRUBIN, INDIRECT: 0.3
BILIRUBIN, TOTAL: 0.3
GGT: 251
GLOBULIN: 2.9
PROTEIN, TOTAL: 7.3

## 2025-04-24 ENCOUNTER — LAB OUTSIDE AN ENCOUNTER (OUTPATIENT)
Dept: URBAN - METROPOLITAN AREA CLINIC 118 | Facility: CLINIC | Age: 60
End: 2025-04-24

## 2025-04-24 ENCOUNTER — TELEPHONE ENCOUNTER (OUTPATIENT)
Dept: URBAN - METROPOLITAN AREA CLINIC 118 | Facility: CLINIC | Age: 60
End: 2025-04-24

## 2025-04-28 ENCOUNTER — OFFICE VISIT (OUTPATIENT)
Dept: URBAN - METROPOLITAN AREA CLINIC 118 | Facility: CLINIC | Age: 60
End: 2025-04-28

## 2025-06-16 ENCOUNTER — OFFICE VISIT (OUTPATIENT)
Dept: URBAN - METROPOLITAN AREA CLINIC 118 | Facility: CLINIC | Age: 60
End: 2025-06-16

## 2025-06-16 ENCOUNTER — TELEPHONE ENCOUNTER (OUTPATIENT)
Dept: URBAN - METROPOLITAN AREA CLINIC 118 | Facility: CLINIC | Age: 60
End: 2025-06-16

## 2025-06-18 ENCOUNTER — WEB ENCOUNTER (OUTPATIENT)
Dept: URBAN - METROPOLITAN AREA CLINIC 118 | Facility: CLINIC | Age: 60
End: 2025-06-18

## 2025-06-18 ENCOUNTER — LAB OUTSIDE AN ENCOUNTER (OUTPATIENT)
Dept: URBAN - METROPOLITAN AREA CLINIC 118 | Facility: CLINIC | Age: 60
End: 2025-06-18

## 2025-06-18 PROBLEM — 1082431000119108: Status: ACTIVE | Noted: 2025-06-18

## 2025-06-18 RX ORDER — AMLODIPINE BESYLATE 10 MG/1
1 TABLET TABLET ORAL ONCE A DAY
Qty: 90 TABLET | COMMUNITY

## 2025-06-18 RX ORDER — TIZANIDINE 4 MG/1
TABLET ORAL
Qty: 360 TABLET | COMMUNITY

## 2025-06-18 RX ORDER — CARISOPRODOL 350 MG/1
TAKE 1 TO 2 TABLETS BY MOUTH EVERY 4 TO 6 HOURS AS NEEDED FOR PAIN TABLET ORAL
Qty: 120 EACH | Refills: 0 | COMMUNITY

## 2025-06-18 RX ORDER — METHIMAZOLE 5 MG/1
TABLET ORAL
Qty: 90 TABLET | COMMUNITY

## 2025-06-18 RX ORDER — GABAPENTIN 300 MG/1
CAPSULE ORAL
Qty: 270 CAPSULE | COMMUNITY

## 2025-06-18 RX ORDER — PROPRANOLOL HYDROCHLORIDE 60 MG/1
TAKE 1 TABLET BY MOUTH TWICE A DAY TABLET ORAL
Qty: 180 EACH | Refills: 0 | COMMUNITY

## 2025-06-18 RX ORDER — LISINOPRIL AND HYDROCHLOROTHIAZIDE 12.5; 2 MG/1; MG/1
TABLET ORAL
Qty: 90 TABLET | COMMUNITY

## 2025-06-23 ENCOUNTER — OFFICE VISIT (OUTPATIENT)
Dept: URBAN - METROPOLITAN AREA CLINIC 118 | Facility: CLINIC | Age: 60
End: 2025-06-23

## 2025-07-14 ENCOUNTER — OFFICE VISIT (OUTPATIENT)
Dept: URBAN - METROPOLITAN AREA MEDICAL CENTER 28 | Facility: MEDICAL CENTER | Age: 60
End: 2025-07-14

## 2025-07-24 ENCOUNTER — TELEPHONE ENCOUNTER (OUTPATIENT)
Dept: URBAN - METROPOLITAN AREA MEDICAL CENTER 28 | Facility: MEDICAL CENTER | Age: 60
End: 2025-07-24

## 2025-08-05 ENCOUNTER — TELEPHONE ENCOUNTER (OUTPATIENT)
Dept: URBAN - METROPOLITAN AREA CLINIC 117 | Facility: CLINIC | Age: 60
End: 2025-08-05

## 2025-08-07 ENCOUNTER — OFFICE VISIT (OUTPATIENT)
Dept: URBAN - METROPOLITAN AREA MEDICAL CENTER 16 | Facility: MEDICAL CENTER | Age: 60
End: 2025-08-07
Payer: MEDICARE

## 2025-08-07 DIAGNOSIS — K83.8 ACQUIRED DILATION OF BILE DUCT: ICD-10-CM

## 2025-08-07 PROCEDURE — 43259 EGD US EXAM DUODENUM/JEJUNUM: CPT | Performed by: STUDENT IN AN ORGANIZED HEALTH CARE EDUCATION/TRAINING PROGRAM

## 2025-08-07 RX ORDER — GABAPENTIN 300 MG/1
CAPSULE ORAL
Qty: 270 CAPSULE | COMMUNITY

## 2025-08-07 RX ORDER — LISINOPRIL AND HYDROCHLOROTHIAZIDE 12.5; 2 MG/1; MG/1
TABLET ORAL
Qty: 90 TABLET | COMMUNITY

## 2025-08-07 RX ORDER — METHIMAZOLE 5 MG/1
TABLET ORAL
Qty: 90 TABLET | COMMUNITY

## 2025-08-07 RX ORDER — TIZANIDINE 4 MG/1
TABLET ORAL
Qty: 360 TABLET | COMMUNITY

## 2025-08-07 RX ORDER — PROPRANOLOL HYDROCHLORIDE 60 MG/1
TAKE 1 TABLET BY MOUTH TWICE A DAY TABLET ORAL
Qty: 180 EACH | Refills: 0 | COMMUNITY

## 2025-08-07 RX ORDER — CARISOPRODOL 350 MG/1
TAKE 1 TO 2 TABLETS BY MOUTH EVERY 4 TO 6 HOURS AS NEEDED FOR PAIN TABLET ORAL
Qty: 120 EACH | Refills: 0 | COMMUNITY

## 2025-08-07 RX ORDER — AMLODIPINE BESYLATE 10 MG/1
1 TABLET TABLET ORAL ONCE A DAY
Qty: 90 TABLET | COMMUNITY

## 2025-08-11 ENCOUNTER — OFFICE VISIT (OUTPATIENT)
Dept: URBAN - METROPOLITAN AREA MEDICAL CENTER 28 | Facility: MEDICAL CENTER | Age: 60
End: 2025-08-11